# Patient Record
Sex: MALE | Race: WHITE | NOT HISPANIC OR LATINO
[De-identification: names, ages, dates, MRNs, and addresses within clinical notes are randomized per-mention and may not be internally consistent; named-entity substitution may affect disease eponyms.]

---

## 2023-02-19 ENCOUNTER — TRANSCRIPTION ENCOUNTER (OUTPATIENT)
Age: 38
End: 2023-02-19

## 2023-02-19 ENCOUNTER — RESULT REVIEW (OUTPATIENT)
Age: 38
End: 2023-02-19

## 2023-02-20 ENCOUNTER — RESULT REVIEW (OUTPATIENT)
Age: 38
End: 2023-02-20

## 2023-02-20 ENCOUNTER — TRANSCRIPTION ENCOUNTER (OUTPATIENT)
Age: 38
End: 2023-02-20

## 2023-03-02 PROBLEM — Z00.00 ENCOUNTER FOR PREVENTIVE HEALTH EXAMINATION: Status: ACTIVE | Noted: 2023-03-02

## 2023-03-09 ENCOUNTER — APPOINTMENT (OUTPATIENT)
Dept: SURGERY | Facility: CLINIC | Age: 38
End: 2023-03-09
Payer: COMMERCIAL

## 2023-03-09 DIAGNOSIS — K80.42 CALCULUS OF BILE DUCT WITH ACUTE CHOLECYSTITIS W/OUT OBSTRUCTION: ICD-10-CM

## 2023-03-09 PROCEDURE — 99024 POSTOP FOLLOW-UP VISIT: CPT

## 2023-03-09 NOTE — DATA REVIEWED
[FreeTextEntry1] : Pathology - acute cholecystitis and cholelithiasis - reviwed w patient and patient given copy

## 2023-03-09 NOTE — PHYSICAL EXAM
[de-identified] : NAD [de-identified] : soft, nontender.  well healed incisions. steristrips removed by me. Minimal ecchymosis at umbilical site.

## 2023-03-09 NOTE — HISTORY OF PRESENT ILLNESS
[de-identified] : 37 yr old s/p robotic cholecystectomy on 2/19/23 - for acute cholecystitis and cholelithiasis.  He is here w his wife and overall is doing well. He has limited his diet thus far to avoid any fats at all.  He has also limited his activity appropriately.  He has occasional twinges of pain on the right side but overall feels well. Is having some loose stools and has lost 15 lbs (attempted due to improved diet) but no nausea.

## 2025-04-16 ENCOUNTER — APPOINTMENT (OUTPATIENT)
Facility: CLINIC | Age: 40
End: 2025-04-16